# Patient Record
Sex: MALE | Race: WHITE | NOT HISPANIC OR LATINO | URBAN - METROPOLITAN AREA
[De-identification: names, ages, dates, MRNs, and addresses within clinical notes are randomized per-mention and may not be internally consistent; named-entity substitution may affect disease eponyms.]

---
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
## 2020-06-23 ENCOUNTER — OUTPATIENT (OUTPATIENT)
Dept: OUTPATIENT SERVICES | Facility: HOSPITAL | Age: 20
LOS: 1 days | End: 2020-06-23
Payer: COMMERCIAL

## 2020-06-23 DIAGNOSIS — Z11.59 ENCOUNTER FOR SCREENING FOR OTHER VIRAL DISEASES: ICD-10-CM

## 2020-06-23 PROBLEM — Q82.5 CONGENITAL NON-NEOPLASTIC NEVUS: Chronic | Status: ACTIVE | Noted: 2020-06-17

## 2020-06-23 LAB — SARS-COV-2 RNA SPEC QL NAA+PROBE: SIGNIFICANT CHANGE UP

## 2020-06-23 PROCEDURE — U0003: CPT

## 2020-06-25 ENCOUNTER — TRANSCRIPTION ENCOUNTER (OUTPATIENT)
Age: 20
End: 2020-06-25

## 2020-06-26 ENCOUNTER — OUTPATIENT (OUTPATIENT)
Dept: OUTPATIENT SERVICES | Facility: HOSPITAL | Age: 20
LOS: 1 days | End: 2020-06-26
Payer: COMMERCIAL

## 2020-06-26 ENCOUNTER — RESULT REVIEW (OUTPATIENT)
Age: 20
End: 2020-06-26

## 2020-06-26 VITALS
SYSTOLIC BLOOD PRESSURE: 122 MMHG | HEART RATE: 77 BPM | DIASTOLIC BLOOD PRESSURE: 66 MMHG | OXYGEN SATURATION: 100 % | RESPIRATION RATE: 17 BRPM

## 2020-06-26 VITALS
OXYGEN SATURATION: 100 % | WEIGHT: 125 LBS | TEMPERATURE: 98 F | HEIGHT: 74 IN | HEART RATE: 63 BPM | DIASTOLIC BLOOD PRESSURE: 73 MMHG | SYSTOLIC BLOOD PRESSURE: 126 MMHG | RESPIRATION RATE: 18 BRPM

## 2020-06-26 DIAGNOSIS — D48.9 NEOPLASM OF UNCERTAIN BEHAVIOR, UNSPECIFIED: ICD-10-CM

## 2020-06-26 DIAGNOSIS — Z01.818 ENCOUNTER FOR OTHER PREPROCEDURAL EXAMINATION: ICD-10-CM

## 2020-06-26 PROCEDURE — 13121 CMPLX RPR S/A/L 2.6-7.5 CM: CPT | Mod: XU

## 2020-06-26 PROCEDURE — 88305 TISSUE EXAM BY PATHOLOGIST: CPT

## 2020-06-26 PROCEDURE — 14021 TIS TRNFR S/A/L 10.1-30 SQCM: CPT | Mod: XS

## 2020-06-26 PROCEDURE — 88305 TISSUE EXAM BY PATHOLOGIST: CPT | Mod: 26

## 2020-06-26 PROCEDURE — 11406 EXC TR-EXT B9+MARG >4.0 CM: CPT

## 2020-06-26 PROCEDURE — 14041 TIS TRNFR F/C/C/M/N/A/G/H/F: CPT | Mod: XS

## 2020-06-26 RX ORDER — FENTANYL CITRATE 50 UG/ML
25 INJECTION INTRAVENOUS
Refills: 0 | Status: DISCONTINUED | OUTPATIENT
Start: 2020-06-26 | End: 2020-06-26

## 2020-06-26 RX ORDER — CHLORHEXIDINE GLUCONATE 213 G/1000ML
1 SOLUTION TOPICAL ONCE
Refills: 0 | Status: COMPLETED | OUTPATIENT
Start: 2020-06-26 | End: 2020-06-26

## 2020-06-26 RX ORDER — OXYCODONE HYDROCHLORIDE 5 MG/1
5 TABLET ORAL ONCE
Refills: 0 | Status: DISCONTINUED | OUTPATIENT
Start: 2020-06-26 | End: 2020-06-26

## 2020-06-26 RX ORDER — SODIUM CHLORIDE 9 MG/ML
1000 INJECTION, SOLUTION INTRAVENOUS
Refills: 0 | Status: DISCONTINUED | OUTPATIENT
Start: 2020-06-26 | End: 2020-07-11

## 2020-06-26 RX ORDER — ONDANSETRON 8 MG/1
4 TABLET, FILM COATED ORAL ONCE
Refills: 0 | Status: DISCONTINUED | OUTPATIENT
Start: 2020-06-26 | End: 2020-07-11

## 2020-06-26 RX ORDER — LIDOCAINE HCL 20 MG/ML
0.2 VIAL (ML) INJECTION ONCE
Refills: 0 | Status: DISCONTINUED | OUTPATIENT
Start: 2020-06-26 | End: 2020-07-11

## 2020-06-26 RX ORDER — SODIUM CHLORIDE 9 MG/ML
3 INJECTION INTRAMUSCULAR; INTRAVENOUS; SUBCUTANEOUS EVERY 8 HOURS
Refills: 0 | Status: DISCONTINUED | OUTPATIENT
Start: 2020-06-26 | End: 2020-07-11

## 2020-06-26 RX ADMIN — CHLORHEXIDINE GLUCONATE 1 APPLICATION(S): 213 SOLUTION TOPICAL at 07:58

## 2020-06-26 NOTE — ASU DISCHARGE PLAN (ADULT/PEDIATRIC) - ASU DC SPECIAL INSTRUCTIONSFT
ok to remove outer bandage in 24 hours  ok to shower after bandage removed  keep steri strips on  if steri strips fall off apply bacitracin twicw a day

## 2020-06-26 NOTE — ASU DISCHARGE PLAN (ADULT/PEDIATRIC) - CALL YOUR DOCTOR IF YOU HAVE ANY OF THE FOLLOWING:
Fever greater than (need to indicate Fahrenheit or Celsius)/Nausea and vomiting that does not stop/Unable to urinate/Bleeding that does not stop/Pain not relieved by Medications/Swelling that gets worse/Wound/Surgical Site with redness, or foul smelling discharge or pus

## 2020-06-26 NOTE — PRE-ANESTHESIA EVALUATION ADULT - NSANTHOSAYNRD_GEN_A_CORE
No. [place] screening performed.  STOP BANG Legend: 0-2 = LOW Risk; 3-4 = INTERMEDIATE Risk; 5-8 = HIGH Risk

## 2020-06-26 NOTE — ASU DISCHARGE PLAN (ADULT/PEDIATRIC) - CARE PROVIDER_API CALL
Lam Ruelas  PLASTIC SURGERY  [address] [place] [id number]  Phone: [phone]  Fax: [phone]  Follow Up Time:

## 2020-07-01 LAB — SURGICAL PATHOLOGY STUDY: SIGNIFICANT CHANGE UP

## 2023-06-05 ENCOUNTER — NON-APPOINTMENT (OUTPATIENT)
Age: 23
End: 2023-06-05

## 2023-06-05 ENCOUNTER — APPOINTMENT (OUTPATIENT)
Dept: ORTHOPEDIC SURGERY | Facility: CLINIC | Age: 23
End: 2023-06-05
Payer: COMMERCIAL

## 2023-06-05 VITALS — HEIGHT: 74 IN | BODY MASS INDEX: 17.97 KG/M2 | WEIGHT: 140 LBS

## 2023-06-05 DIAGNOSIS — S93.491A SPRAIN OF OTHER LIGAMENT OF RIGHT ANKLE, INITIAL ENCOUNTER: ICD-10-CM

## 2023-06-05 PROCEDURE — 99203 OFFICE O/P NEW LOW 30 MIN: CPT

## 2023-06-05 NOTE — ASSESSMENT
[FreeTextEntry1] : Lateral ankle sprain after a basketball injury 1 week ago.  Still with somewhat of a limp but he reports swelling significantly improved.  Advised switching to ASO brace and starting physical therapy.  Follow-up in 2 or 3 weeks for recheck.  He lives in [place] and is requesting to be seen in our [place] office, will arrange follow-up as requested.

## 2023-06-05 NOTE — IMAGING
[de-identified] : Right ankle Lateral ankle swelling\par Tenderness over ATFL\par Range of motion limited by pain\par Pain with resisted inversion\par Positive anterior drawer\par Motor and sensory intact distally\par Negative samanta\par Antalgic gait\par \par

## 2023-06-05 NOTE — HISTORY OF PRESENT ILLNESS
[Sports related] : sports related [Gradual] : gradual [6] : 6 [3] : 3 [Intermittent] : intermittent [Household chores] : household chores [Rest] : rest [Walking] : walking [de-identified] : 06/05/2023 pt presents here today with right ankle pain after twisting the ankle while playing basketball on 05/28/2023\par pt went to urgent care and got x rays done  [] : no [FreeTextEntry1] : right ankle  [FreeTextEntry2] : playing basketball  [FreeTextEntry9] : brace  [de-identified] : urgent care  [de-identified] : x rays done at at  [facility] [de-identified] : brace

## 2023-06-19 ENCOUNTER — APPOINTMENT (OUTPATIENT)
Dept: ORTHOPEDIC SURGERY | Facility: CLINIC | Age: 23
End: 2023-06-19
Payer: COMMERCIAL

## 2023-06-19 VITALS — HEIGHT: 74 IN | BODY MASS INDEX: 17.97 KG/M2 | WEIGHT: 140 LBS

## 2023-06-19 PROCEDURE — 99214 OFFICE O/P EST MOD 30 MIN: CPT

## 2023-06-19 NOTE — HISTORY OF PRESENT ILLNESS
[7] : 7 [0] : 0 [Sharp] : sharp [Tightness] : tightness [de-identified] : Pt is a 22 year old male who presents today for evaluation of his right ankle. Pt states that he sprained his right ankle playing basketball 05/28/23. Pain localized along the lateral/front of the ankle. Certain movement and running increases pain. [facility]R taken [facility] 05/28/23. Denies trauma. No numbness/tingling. Ice and elevation to affected area. First few weeks was on an aircast. WB in sneakers. [FreeTextEntry2] : Right Ankle

## 2023-06-19 NOTE — DATA REVIEWED
[Outside X-rays] : outside x-rays [Right] : of the right [Ankle] : ankle [I independently reviewed and interpreted images and report] : I independently reviewed and interpreted images and report [FreeTextEntry1] : Urgent Care 5/28/23: No acute fractures or bony abnormalities noted.

## 2023-06-19 NOTE — ASSESSMENT
[FreeTextEntry1] : Pt. has an ankle sprain that is improving, however there is still weakness and stiffness that would benefit from further PT. A new rx for therapy provided today. WB in supportive footwear. NSAIDS, ice and activity modification discussed.

## 2023-06-19 NOTE — PHYSICAL EXAM
[Right] : right foot and ankle [NL (40)] : plantar flexion 40 degrees [4___] : eversion 4[unfilled]/5 [5___] : [facility] 5[unfilled]/5 [2+] : posterior tibialis pulse: 2+ [Normal] : saphenous nerve sensation normal [] : no pain when stressing lateral tarsal metatarsal joint [FreeTextEntry3] : Minimal lateral ankle swelling.\par  [FreeTextEntry9] : LT 15 40 30 20 [de-identified] : inversion 10 degrees [TWNoteComboBox7] : dorsiflexion 10 degrees [de-identified] : eversion 10 degrees

## 2023-07-31 ENCOUNTER — APPOINTMENT (OUTPATIENT)
Dept: ORTHOPEDIC SURGERY | Facility: CLINIC | Age: 23
End: 2023-07-31
Payer: COMMERCIAL

## 2023-07-31 DIAGNOSIS — S93.491D SPRAIN OF OTHER LIGAMENT OF RIGHT ANKLE, SUBSEQUENT ENCOUNTER: ICD-10-CM

## 2023-07-31 PROCEDURE — 99213 OFFICE O/P EST LOW 20 MIN: CPT

## 2023-07-31 NOTE — HISTORY OF PRESENT ILLNESS
[0] : 0 [2] : 2 [Dull/Aching] : dull/aching [de-identified] : Patient retursn for his right ankle sprain which was sustained while playing basketball 05/28/23.  He has been going to PT and reports doing very well.  He reports no pain at this time. [FreeTextEntry2] : Right Ankle

## 2023-07-31 NOTE — PHYSICAL EXAM
[Right] : right foot and ankle [NL (40)] : plantar flexion 40 degrees [2+] : posterior tibialis pulse: 2+ [Normal] : saphenous nerve sensation normal [NL 30)] : inversion 30 degrees [NL (20)] : eversion 20 degrees [5___] : eversion 5[unfilled]/5 [] : no pain when stressing lateral tarsal metatarsal joint [FreeTextEntry3] : Minimal lateral ankle swelling.\par   [FreeTextEntry9] : LT 15 40 30 20 [TWNoteComboBox7] : dorsiflexion 15 degrees

## 2023-07-31 NOTE — ASSESSMENT
[FreeTextEntry1] : Patient is doing much better.  He will complete his course of PT and continue with home ROM exercises. He can increase his activity level as tolerated.  Progress Note entered by Jonathon Alexandra PA-C working as a scribe for Dr. Kumari. Patient seen by Jonathon Alexandra PA-C under the supervision of Dr. Kumari.